# Patient Record
Sex: FEMALE | Race: WHITE | Employment: OTHER | URBAN - METROPOLITAN AREA
[De-identification: names, ages, dates, MRNs, and addresses within clinical notes are randomized per-mention and may not be internally consistent; named-entity substitution may affect disease eponyms.]

---

## 2018-03-28 ENCOUNTER — HOSPITAL ENCOUNTER (OUTPATIENT)
Dept: GENERAL RADIOLOGY | Age: 68
Discharge: HOME | End: 2018-03-30
Payer: COMMERCIAL

## 2018-03-28 ENCOUNTER — HOSPITAL ENCOUNTER (OUTPATIENT)
Age: 68
Discharge: HOME | End: 2018-03-30
Payer: COMMERCIAL

## 2018-03-28 DIAGNOSIS — M54.40 ACUTE RIGHT-SIDED LOW BACK PAIN WITH SCIATICA, SCIATICA LATERALITY UNSPECIFIED: ICD-10-CM

## 2018-03-28 PROCEDURE — 72110 X-RAY EXAM L-2 SPINE 4/>VWS: CPT

## 2020-09-15 ENCOUNTER — HOSPITAL ENCOUNTER (OUTPATIENT)
Dept: MAMMOGRAPHY | Age: 70
Discharge: HOME | End: 2020-09-17
Payer: MEDICARE

## 2020-09-15 PROCEDURE — 77063 BREAST TOMOSYNTHESIS BI: CPT

## 2021-08-13 ENCOUNTER — HOSPITAL ENCOUNTER (OUTPATIENT)
Age: 71
Discharge: HOME | End: 2021-08-15
Payer: MEDICARE

## 2021-08-13 ENCOUNTER — HOSPITAL ENCOUNTER (OUTPATIENT)
Dept: GENERAL RADIOLOGY | Age: 71
Discharge: HOME | End: 2021-08-15
Payer: MEDICARE

## 2021-08-13 DIAGNOSIS — M79.671 RIGHT FOOT PAIN: ICD-10-CM

## 2021-08-13 PROCEDURE — 73630 X-RAY EXAM OF FOOT: CPT

## 2024-09-09 ENCOUNTER — HOSPITAL ENCOUNTER (OUTPATIENT)
Age: 74
Discharge: HOME | End: 2024-09-11
Payer: MEDICARE

## 2024-09-09 ENCOUNTER — HOSPITAL ENCOUNTER (OUTPATIENT)
Dept: GENERAL RADIOLOGY | Age: 74
Discharge: HOME | End: 2024-09-11
Attending: FAMILY MEDICINE
Payer: MEDICARE

## 2024-09-09 DIAGNOSIS — M25.571 ARTHRALGIA OF RIGHT FOOT: ICD-10-CM

## 2024-09-09 DIAGNOSIS — M25.572 ARTHRALGIA OF LEFT FOOT: ICD-10-CM

## 2024-09-09 PROCEDURE — 73630 X-RAY EXAM OF FOOT: CPT

## 2024-10-06 ENCOUNTER — HOSPITAL ENCOUNTER (EMERGENCY)
Age: 74
Discharge: HOME | End: 2024-10-06
Payer: MEDICARE

## 2024-10-06 VITALS
RESPIRATION RATE: 14 BRPM | WEIGHT: 160 LBS | BODY MASS INDEX: 26.66 KG/M2 | HEIGHT: 65 IN | OXYGEN SATURATION: 99 % | SYSTOLIC BLOOD PRESSURE: 165 MMHG | HEART RATE: 64 BPM | TEMPERATURE: 97.7 F | DIASTOLIC BLOOD PRESSURE: 72 MMHG

## 2024-10-06 DIAGNOSIS — R23.2 FACIAL FLUSHING: ICD-10-CM
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
## 2024-10-06 DIAGNOSIS — S60.469A: Primary | ICD-10-CM

## 2024-10-06 PROCEDURE — 99282 EMERGENCY DEPT VISIT SF MDM: CPT

## 2024-10-06 ASSESSMENT — LIFESTYLE VARIABLES
HOW MANY STANDARD DRINKS CONTAINING ALCOHOL DO YOU HAVE ON A TYPICAL DAY: PATIENT DOES NOT DRINK
HOW OFTEN DO YOU HAVE A DRINK CONTAINING ALCOHOL: NEVER

## 2024-10-06 NOTE — ED PROVIDER NOTES
[facility] Visit.     Department of Emergency Medicine   ED  Provider Note  Admit Date/Time: 10/6/2024 11:58 AM  ED Bed: 34/34     MRN: [id number]  CHIEF COMPLAINT:   Insect Bite (Left ring finger bit on Thursday. Now having warmth, swelling, and redness to face )       HISTORY OF PRESENT ILLNESS:      Caitie Crockett is a 73 y.o. female who presents to the ED for facial redness that she noticed this morning.  Patient states she got bit by an insect on her left ring finger this past Thursday.  She states she was having a lot of redness and swelling to that area which prompted her visit to the urgent care yesterday.  She states she was given a shot of Decadron and woke up this morning with decreased swelling to her finger.  She reports significant relief of the itching and redness to her insect bite.  She states she woke up this morning with redness to both sides of her cheeks and also noticed some swelling.  She denies any difficulty with breathing, swallowing, or handling her secretions.  She has no fever/chills, cough, congestion, sore throat, ear pain, dental pain, abdominal pain, nausea, vomiting, headache, dizziness, or difficulty with ambulation or balance.  Patient is unsure if this was due to the steroid shot she received yesterday.  She states urgent care advised her to come to the emergency department if she developed any redness to her face.  Patient states the redness and swelling has significantly improved since she woke up.  She states she was able to go to [employer] and do some work around the house prior to arrival to the emergency department.  Patient states she was prescribed a Medrol Dosepak to start today but she did not start it yet as she was unsure if this would make her symptoms worse.  Patient is alert and oriented x 3 and in no apparent distress at this exam.  She is nontoxic-appearing.    PCP: Konstantin Wyatt MD    REVIEW OF SYSTEMS:   Pertinent positives and negatives are stated within

## 2025-08-28 LAB
ALBUMIN SERPL-MCNC: 4.6 G/DL (ref 3.5–5.2)
ALP SERPL-CCNC: 70 U/L (ref 35–104)
ALT SERPL-CCNC: 99 U/L (ref 0–35)
AST SERPL-CCNC: 68 U/L (ref 0–35)
BILIRUB DIRECT SERPL-MCNC: 0.2 MG/DL (ref 0–0.2)
BILIRUB INDIRECT SERPL-MCNC: 0.3 MG/DL (ref 0–1)
BILIRUB SERPL-MCNC: 0.5 MG/DL (ref 0–1.2)
PROT SERPL-MCNC: 7.2 G/DL (ref 6.4–8.3)